# Patient Record
Sex: FEMALE | Race: BLACK OR AFRICAN AMERICAN
[De-identification: names, ages, dates, MRNs, and addresses within clinical notes are randomized per-mention and may not be internally consistent; named-entity substitution may affect disease eponyms.]

---

## 2019-09-07 ENCOUNTER — HOSPITAL ENCOUNTER (OUTPATIENT)
Dept: HOSPITAL 62 - LC | Age: 24
Discharge: HOME | End: 2019-09-07
Attending: OBSTETRICS & GYNECOLOGY
Payer: MEDICAID

## 2019-09-07 DIAGNOSIS — Z3A.35: ICD-10-CM

## 2019-09-07 DIAGNOSIS — O24.419: Primary | ICD-10-CM

## 2019-09-07 PROCEDURE — 59025 FETAL NON-STRESS TEST: CPT

## 2019-09-07 PROCEDURE — 4A1HXCZ MONITORING OF PRODUCTS OF CONCEPTION, CARDIAC RATE, EXTERNAL APPROACH: ICD-10-PCS | Performed by: OBSTETRICS & GYNECOLOGY

## 2019-10-08 ENCOUNTER — HOSPITAL ENCOUNTER (INPATIENT)
Dept: HOSPITAL 62 - LC | Age: 24
LOS: 2 days | Discharge: HOME | End: 2019-10-10
Attending: OBSTETRICS & GYNECOLOGY | Admitting: OBSTETRICS & GYNECOLOGY
Payer: MEDICAID

## 2019-10-08 DIAGNOSIS — Z3A.40: ICD-10-CM

## 2019-10-08 DIAGNOSIS — O32.6XX0: ICD-10-CM

## 2019-10-08 LAB
ADD MANUAL DIFF: NO
APPEARANCE UR: CLEAR
APTT PPP: YELLOW S
BARBITURATES UR QL SCN: NEGATIVE
BASOPHILS # BLD AUTO: 0.1 10^3/UL (ref 0–0.2)
BASOPHILS NFR BLD AUTO: 0.9 % (ref 0–2)
BILIRUB UR QL STRIP: NEGATIVE
EOSINOPHIL # BLD AUTO: 0 10^3/UL (ref 0–0.6)
EOSINOPHIL NFR BLD AUTO: 0.4 % (ref 0–6)
ERYTHROCYTE [DISTWIDTH] IN BLOOD BY AUTOMATED COUNT: 13.4 % (ref 11.5–14)
GLUCOSE UR STRIP-MCNC: NEGATIVE MG/DL
HCT VFR BLD CALC: 39.6 % (ref 36–47)
HGB BLD-MCNC: 13.4 G/DL (ref 12–15.5)
KETONES UR STRIP-MCNC: NEGATIVE MG/DL
LYMPHOCYTES # BLD AUTO: 1.6 10^3/UL (ref 0.5–4.7)
LYMPHOCYTES NFR BLD AUTO: 26.7 % (ref 13–45)
MCH RBC QN AUTO: 32.1 PG (ref 27–33.4)
MCHC RBC AUTO-ENTMCNC: 33.8 G/DL (ref 32–36)
MCV RBC AUTO: 95 FL (ref 80–97)
METHADONE UR QL SCN: NEGATIVE
MONOCYTES # BLD AUTO: 0.4 10^3/UL (ref 0.1–1.4)
MONOCYTES NFR BLD AUTO: 7.1 % (ref 3–13)
NEUTROPHILS # BLD AUTO: 4 10^3/UL (ref 1.7–8.2)
NEUTS SEG NFR BLD AUTO: 64.9 % (ref 42–78)
NITRITE UR QL STRIP: NEGATIVE
PCP UR QL SCN: NEGATIVE
PH UR STRIP: 7 [PH] (ref 5–9)
PLATELET # BLD: 145 10^3/UL (ref 150–450)
PROT UR STRIP-MCNC: NEGATIVE MG/DL
RBC # BLD AUTO: 4.17 10^6/UL (ref 3.72–5.28)
SP GR UR STRIP: 1.01
TOTAL CELLS COUNTED % (AUTO): 100 %
URINE AMPHETAMINES SCREEN: NEGATIVE
URINE BENZODIAZEPINES SCREEN: NEGATIVE
URINE COCAINE SCREEN: NEGATIVE
URINE MARIJUANA (THC) SCREEN: NEGATIVE
UROBILINOGEN UR-MCNC: NEGATIVE MG/DL (ref ?–2)
WBC # BLD AUTO: 6.2 10^3/UL (ref 4–10.5)

## 2019-10-08 PROCEDURE — 36415 COLL VENOUS BLD VENIPUNCTURE: CPT

## 2019-10-08 PROCEDURE — 86592 SYPHILIS TEST NON-TREP QUAL: CPT

## 2019-10-08 PROCEDURE — 85025 COMPLETE CBC W/AUTO DIFF WBC: CPT

## 2019-10-08 PROCEDURE — 86901 BLOOD TYPING SEROLOGIC RH(D): CPT

## 2019-10-08 PROCEDURE — 80307 DRUG TEST PRSMV CHEM ANLYZR: CPT

## 2019-10-08 PROCEDURE — 0HQ9XZZ REPAIR PERINEUM SKIN, EXTERNAL APPROACH: ICD-10-PCS | Performed by: ADVANCED PRACTICE MIDWIFE

## 2019-10-08 PROCEDURE — 86900 BLOOD TYPING SEROLOGIC ABO: CPT

## 2019-10-08 PROCEDURE — 85027 COMPLETE CBC AUTOMATED: CPT

## 2019-10-08 PROCEDURE — 81005 URINALYSIS: CPT

## 2019-10-08 PROCEDURE — 86850 RBC ANTIBODY SCREEN: CPT

## 2019-10-08 PROCEDURE — 0UQMXZZ REPAIR VULVA, EXTERNAL APPROACH: ICD-10-PCS | Performed by: ADVANCED PRACTICE MIDWIFE

## 2019-10-08 RX ADMIN — DOCUSATE SODIUM SCH MG: 100 CAPSULE, LIQUID FILLED ORAL at 17:25

## 2019-10-08 RX ADMIN — FERROUS SULFATE TAB 325 MG (65 MG ELEMENTAL FE) SCH: 325 (65 FE) TAB at 15:40

## 2019-10-08 RX ADMIN — FAMOTIDINE SCH: 20 TABLET, FILM COATED ORAL at 21:43

## 2019-10-08 RX ADMIN — FERROUS SULFATE TAB 325 MG (65 MG ELEMENTAL FE) SCH MG: 325 (65 FE) TAB at 17:26

## 2019-10-08 RX ADMIN — DOCUSATE SODIUM SCH: 100 CAPSULE, LIQUID FILLED ORAL at 15:40

## 2019-10-08 RX ADMIN — Medication SCH: at 19:32

## 2019-10-08 RX ADMIN — FAMOTIDINE SCH: 20 TABLET, FILM COATED ORAL at 19:32

## 2019-10-08 RX ADMIN — SENNOSIDES, DOCUSATE SODIUM SCH: 50; 8.6 TABLET, FILM COATED ORAL at 19:32

## 2019-10-08 RX ADMIN — IBUPROFEN SCH MG: 800 TABLET, FILM COATED ORAL at 21:42

## 2019-10-08 RX ADMIN — IBUPROFEN SCH: 800 TABLET, FILM COATED ORAL at 17:46

## 2019-10-08 NOTE — ADMISSION PHYSICAL
=================================================================



=================================================================

Datetime Report Generated by CPN: 10/08/ 08:44

   

   

=================================================================

CURRENT ADMISSION

=================================================================

   

Prenatal Hx Assessment:  The Prenatal History has been Reviewed and is

   Current

Chief Complaint:  Uterine Contractions

Indication for Induction:  Not Applicable

Admit Impression :  Term, Intrauterine Pregnancy

Admit Plan:  Admit to Unit; Initiate Labor Protocol

   

=================================================================

ALLERGIES

=================================================================

   

Medication Allergies:  No

Medication Allergies:  No Known Allergies (10/08/2019)

Latex:  No Latex Allergies

   

=================================================================

OBSTETRICAL HISTORY

=================================================================

   

EDC:  10/07/2019 00:00

:  2

Para:  0

:  1

Livin

Gestational Diabetes:  Yes

Rh Sensitization:  No

Incompetent Cervix:  No

TRAV:  No

Infertility:  No

ART Treatment:  No

Uterine Anomaly:  No

IUGR:  No

Hx Previous C/S:  No

Macrosomia:  No

Hx Loss/Stillborn:  No

PIH:  No

Hx  Death:  No

Placenta Previa/Abruption:  No

Depression/PP Depression:  No

PTL/PROM:  No

Post Partum Hemorrhage:  No

Current Pregnancy Procedures:  Ultrasound; NST

Obstetrical History Comments:  G1-Delivered at 19.2 weeks, fetal death

   within 30 minutes of birth

   G2-Current

   

=================================================================

***SEE PRENATAL RECORDS***

=================================================================

   

Alcohol:  No

Marijuana :  No

Cocaine:  No

Other Illicit Drugs:  No

Cigarettes:  Never Smoker. [id number]

   

=================================================================

MEDICAL HISTORY

=================================================================

   

Diabetes:  Yes

Diabetes Type:  Gestational Diabetes

Blood Transfusion:  No

Pulmonary Disease (Asthma, TB):  No

Breast Disease:  No

Hypertension:  No

Gyn Surgery:  No

Heart Disease:  No

Hosp/Surgery:  No

Autoimmune Disorder:  No

Anesthetic Complications:  No

Kidney Disease:  No

Abnormal Pap Smear:  No

Neuro/Epilepsy:  No

Psychiatric Disorders:  No

Other Medical Diseases:  No

Hepatitis/Liver Disease:  No

Significant Family History:  No

Varicosities/Phlebitis:  No

Trauma/Violence :  No

Thyroid Dysfunction:  No

Medical History Comments:  GDM-diet controlled

   

=================================================================

INFECTIOUS HISTORY

=================================================================

   

Gonorrhea:  No

Genital Herpes:  No

Chlamydia:  No

Tuberculosis:  No

Syphilis:  No

Hepatitis:  No

HIV/AIDS Exposure:  No

Rash or Viral Illness:  No

HPV:  No

   

=================================================================

PHYSICAL EXAM

=================================================================

   

General:  Normal

HEENT:  Normal

Neurologic:  Normal

Thyroid:  Normal

Heart:  Normal

Lungs:  Normal

Breast:  Normal

Back:  Normal

Abdomen:  Normal

Genitourinary Exam:  Normal

Extremities:  Normal

DTRs:  Normal

Pelvic Type:  Adequate

Physical Exam Comments:  G2 P 1

   MFM for GDM

   Fetal loss at 20 weeks

   Marginal cord insertion

   IUGR, IOL @ 39 weeks, pt declined

   Fetal Choroid plexus cyst

   GBS neg

Vital Signs:  Reviewed

   

=================================================================

MEMBRANES

=================================================================

   

Membranes:  Intact

   

=================================================================

FETUS A

=================================================================

   

EGA:  40.1

Monitoring:  External US

Fetal Presentation:  Vertex

Admit Comment:  Admitted to LD in active labor @ 40.1, complete,

   bulging BOW

   UC's q 2 min, variable decelerations with uc's, Cat 1 strip

   will arom and anticipate 

   

=================================================================

PLANS FOR LABOR AND DELIVERY

=================================================================

   

Labor and Delivery:  None

Pain Management:  None

Feeding Preference:  Breast

Circumcision:  N/A

   

=================================================================

INFORMED CONSENT

=================================================================

   

Assignment:  Rosalie Monroy MD

Signature:  Electronically signed by Mandy Delgadillo CNM on 10/8/2019 at

   08:43  with User ID: [email or web address]

:  Electronically signed by Mandy Delgadillo CNM on 10/8/2019 at 08:43  with

   User ID: [email or web address]

## 2019-10-08 NOTE — DELIVERY SUMMARY
=================================================================

Del Sum A-C

=================================================================

Datetime Report Generated by CPN: 10/2019 09:53

   

   

=================================================================

DELIVERY PERSONNEL

=================================================================

   

DELIVERY PERSONNEL:  [id number]

Delivery Doctor::  Mandy Delgadillo CNM

Labor and Delivery Nurse::  Lisa Chambers RN

Labor and Delivery Nurse::  Betty Navarrete RN

Nursery Nurse::  Nelia Spears RN

Scrub Tech/CNA:  Saray Damon CNA II

   

=================================================================

MATERNAL INFORMATION

=================================================================

   

Delivery Anesthesia:  None

Medications After Delivery:  Pitocin Bolus-Please Comment; Pitocin Drip

   20 Units/1000ml NSS

Meds After Delivery Comment:  Pitocin 20 units in 1 L NS bolusing per

   order

Estimated  Blood Loss (ml):  300

Maternal Complications:  Precipitous Labor (<3hrs)

Provider Comments:   viable female for EDVIN to OA over lacerations,

   placed on mothers abd, cord clamped and cut after 2 minutes by

   father of baby, spont delivery of grossly normal placenta, FFFM,

   uterine massage and Pitocin

    lacerations repaired with 2-0 chromic without difficulty, cervix

   intact, melendez placed to assure patency, 100 cc urine obtained

   Baby and mom remain in recovery in stable condition, plans to breast

   feed

   

=================================================================

LABOR SUMMARY

=================================================================

   

EDC:  10/07/2019 00:00

No. Babies in Womb:  1

 Attempted:  No

Labor Anesthesia:  None

   

=================================================================

LABOR INFORMATION

=================================================================

   

Reason for Induction:  Not Applicable

Complete Dilatation:  10/08/2019 08:45

Oxytocin:  N/A

Group B Beta Strep:  neg

Antibiotics # of Doses:  0

Antibiotics Time of Last Dose:  n/a

Name of Antibiotic Given:  n/a

Steroids Given:  None

Reason Steroids Not Administered:  Not Applicable

   

=================================================================

MEMBRANES

=================================================================

   

Membranes Rupture Method:  Artificial

Rupture of Membranes:  10/08/2019 08:48

Length of Rupture (hr):  0.15

Amniotic Fluid Color:  Clear

Amniotic Fluid Amount:  None

Amniotic Fluid Odor:  Normal

   

=================================================================

STAGES OF LABOR

=================================================================

   

Stage 2 hr:  0

Stage 2 min:  12

Stage 3 hr:  0

Stage 3 min:  4

   

=================================================================

VAGINAL DELIVERY

=================================================================

   

Laceration #1:  Perineal; Periurethral

Laceration Extension #1:  First Degree

Laceration Repair:  Yes

Laceration Repair Note:  left and right periurethral sutured and

   perineal lac

   sutured with no issues

Sponge Count Correct:  Yes

Sharps Count Correct:  Yes

   

=================================================================

CSECTION DELIVERY

=================================================================

   

Primary Indication:  N/A

Secondary Indication:  N/A

CSection Incidence:  N/A

Labor:  N/A

Elective:  N/A

CSection Incision:  N/A

   

=================================================================

BABY A INFORMATION

=================================================================

   

Infant Delivery Date/Time:  10/08/2019 08:57

Method of Delivery:  Vaginal

Born in Route :  No

:  N/A

Forceps:  N/A

Vacuum Extraction:  N/A

Shoulder Dystocia :  No

   

=================================================================

PRESENTATION/POSITION BABY A

=================================================================

   

Presentation:  Cephalic

Cephalic Presentation:  Vertex

Vertex Position:  Left Occipital Anterior

Breech Presentation:  N/A

   

=================================================================

PLACENTA INFORMATION BABY A

=================================================================

   

Placenta Delivery Time :  10/08/2019 09:01

Placenta Method of Delivery:  Spontaneous

Placenta Status:  Delivered

   

=================================================================

APGAR SCORES BABY A

=================================================================

   

Heart Rate 1 min:  >100 bpm

Resp Effort 1 min:  Good Cry

Reflex Irritability 1 min:  Cough or Sneeze or Pulls Away

Muscle Tone 1 min:  Active Motion

Color 1 min:  Blue/Pale

Resuscitation Effort 1 min:  Tactile Stimulation

APGAR SCORE 1 MIN:  8

Heart Rate 5 min:  >100 bpm

Resp Effort 5 min:  Good Cry

Reflex Irritability 5 min:  Cough or Sneeze or Pulls Away

Muscle Tone 5 min:  Active Motion

Color 5 min:  Body Pink, Extremities Blue

Resuscitation Effort 5 min:  Tactile Stimulation

APGAR SCORE 5 MIN:  9

   

=================================================================

INFANT INFORMATION BABY A

=================================================================

   

Gestational Age at Delivery:  40.1

Gestational Status:  Full Term- 39- 40.6 Weeks

Infant Outcome :  Liveborn

Infant Condition :  Stable

Infant Sex:  Female

   

=================================================================

IDENTIFICATION BABY A

=================================================================

   

Infant Verification Date/Time:  10/08/2019 08:57

ID Band Number:  [id number]

Mother's Name Verified:  Yes

Infant Medical Record Number:  [id number]

RN Verifying Infant:  C. Regan RN

   

=================================================================

CORD INFORMATION BABY A

=================================================================

   

No. Cord Vessels:  3

Nuchal Cord :  N/A

Nuchal Cord- Other:  compound right hand

Cord Blood Taken:  Yes-For Storage (Mom's Blood type +)

   

=================================================================

ASSESSMENT BABY A

=================================================================

   

Infant Complications:  None

Physical Findings at Delivery:  Within Normal Limits

Infant Respirations:  Appears Normal

Skin to Skin:  Yes

Skin to Skin Time (min):  60

Neonatologist/ALS Called :  No

Infant Care By:  Nelia Spears, RN

   

=================================================================

BABY B INFORMATION

=================================================================

   

 :  N/A

## 2019-10-09 LAB
ERYTHROCYTE [DISTWIDTH] IN BLOOD BY AUTOMATED COUNT: 13.4 % (ref 11.5–14)
HCT VFR BLD CALC: 37.2 % (ref 36–47)
HGB BLD-MCNC: 12.6 G/DL (ref 12–15.5)
MCH RBC QN AUTO: 32.5 PG (ref 27–33.4)
MCHC RBC AUTO-ENTMCNC: 34 G/DL (ref 32–36)
MCV RBC AUTO: 96 FL (ref 80–97)
PLATELET # BLD: 133 10^3/UL (ref 150–450)
RBC # BLD AUTO: 3.89 10^6/UL (ref 3.72–5.28)
WBC # BLD AUTO: 9.5 10^3/UL (ref 4–10.5)

## 2019-10-09 RX ADMIN — DOCUSATE SODIUM SCH MG: 100 CAPSULE, LIQUID FILLED ORAL at 17:29

## 2019-10-09 RX ADMIN — IBUPROFEN SCH MG: 800 TABLET, FILM COATED ORAL at 14:35

## 2019-10-09 RX ADMIN — IBUPROFEN SCH MG: 800 TABLET, FILM COATED ORAL at 21:40

## 2019-10-09 RX ADMIN — FAMOTIDINE SCH MG: 20 TABLET, FILM COATED ORAL at 21:40

## 2019-10-09 RX ADMIN — FAMOTIDINE SCH MG: 20 TABLET, FILM COATED ORAL at 09:55

## 2019-10-09 RX ADMIN — DOCUSATE SODIUM SCH MG: 100 CAPSULE, LIQUID FILLED ORAL at 09:55

## 2019-10-09 RX ADMIN — IBUPROFEN SCH MG: 800 TABLET, FILM COATED ORAL at 05:57

## 2019-10-09 RX ADMIN — SENNOSIDES, DOCUSATE SODIUM SCH EACH: 50; 8.6 TABLET, FILM COATED ORAL at 09:55

## 2019-10-09 RX ADMIN — FERROUS SULFATE TAB 325 MG (65 MG ELEMENTAL FE) SCH MG: 325 (65 FE) TAB at 17:29

## 2019-10-09 RX ADMIN — FERROUS SULFATE TAB 325 MG (65 MG ELEMENTAL FE) SCH MG: 325 (65 FE) TAB at 09:55

## 2019-10-09 RX ADMIN — Medication SCH CAP: at 09:55

## 2019-10-09 NOTE — PDOC PROGRESS REPORT
Subjective-OB


Progress Note for:: 10/09/19 - PP Day #1, doing well, no complaints, A+, rubella

pending, breastfeeding





Physical Exam (OB)


Vital Signs: 


                                        











Temp Pulse Resp BP Pulse Ox


 


 97.5 F   82   14   108/75   99 


 


 10/09/19 07:26  10/09/19 07:26  10/09/19 07:26  10/09/19 07:26  10/09/19 07:26








                                 Intake & Output











 10/08/19 10/09/19 10/10/19





 06:59 06:59 06:59


 


Weight  65.1 kg 














- General


General Appearance: Appears well, Alert


In distress: None





- PIH/Pre-Eclampsia


DTR's: 2 +


Clonus: Negative


Headache: Absent


Epigastric Pain: No


Visual Changes: No





- Lochia


Lochia Amount: Small 10-25 ml


Lochia Color: Rubra/Red





- Abdomen


Description: Soft, Round


Fundal Description: Firm, Midline


Fundal Height: u/u - u/2





- Respiratory


Respiratory Status: No respiratory distress





- Abdominal


Inspection: Normal


Distension: No distension





- Genitourinary


Genitourinary Note: 





voiding





- Extremities


Upper extremity: Normal inspection


Lower extremities: Normal inspection





- Neurological


Cognition: Normal


Orientation: AAOx4





- Psychological


Associated symptoms: Normal affect, Normal mood





- Skin


Skin Temperature: Warm


Skin Moisture: Dry





Objective-Diagnostic


Laboratory: 


                                        





                                 10/09/19 09:51 





                                        











  10/09/19





  09:51


 


WBC  9.5


 


RBC  3.89


 


Hgb  12.6


 


Hct  37.2


 


MCV  96


 


MCH  32.5


 


MCHC  34.0


 


RDW  13.4


 


Plt Count  133 L














Assessment and Plan(PN)





- Assessment and Plan


(1) Normal postpartum course


Is this a current diagnosis for this admission?: Yes   





(2) Delivery normal


Is this a current diagnosis for this admission?: Yes   





(3) Gestational diabetes mellitus


Qualifiers: 


   Gestational diabetes mellitus control: diet-controlled   Trimester: third 

trimester   Qualified Code(s): O24.410 - Gestational diabetes mellitus in 

pregnancy, diet controlled   


Is this a current diagnosis for this admission?: Yes   





- Time Spent with Patient


Time with patient: Less than 15 minutes


Medications reviewed and adjusted accordingly: Yes





- Disposition


Anticipated Discharge: Home


Within: within 24 hours

## 2019-10-10 VITALS — SYSTOLIC BLOOD PRESSURE: 110 MMHG | DIASTOLIC BLOOD PRESSURE: 69 MMHG

## 2019-10-10 RX ADMIN — FAMOTIDINE SCH MG: 20 TABLET, FILM COATED ORAL at 09:09

## 2019-10-10 RX ADMIN — IBUPROFEN SCH: 800 TABLET, FILM COATED ORAL at 13:39

## 2019-10-10 RX ADMIN — IBUPROFEN SCH MG: 800 TABLET, FILM COATED ORAL at 05:57

## 2019-10-10 RX ADMIN — DOCUSATE SODIUM SCH MG: 100 CAPSULE, LIQUID FILLED ORAL at 09:09

## 2019-10-10 RX ADMIN — FERROUS SULFATE TAB 325 MG (65 MG ELEMENTAL FE) SCH MG: 325 (65 FE) TAB at 09:09

## 2019-10-10 RX ADMIN — SENNOSIDES, DOCUSATE SODIUM SCH EACH: 50; 8.6 TABLET, FILM COATED ORAL at 09:09

## 2019-10-10 RX ADMIN — Medication SCH CAP: at 09:09

## 2019-10-10 NOTE — PDOC PROGRESS REPORT
Subjective-OB


Progress Note for:: 10/10/19


Subjective: 





Ready for discharge.





Physical Exam (OB)


Vital Signs: 


                                        











Temp Pulse Resp BP Pulse Ox


 


 97.4 F   87   14   110/69   98 


 


 10/10/19 08:30  10/10/19 08:30  10/10/19 08:30  10/10/19 08:30  10/10/19 08:30








                                 Intake & Output











 10/09/19 10/10/19 10/11/19





 06:59 06:59 06:59


 


Intake Total  400 


 


Balance  400 


 


Weight 65.1 kg  














- PIH/Pre-Eclampsia


DTR's: 1 +


Clonus: Negative


Headache: Absent


Epigastric Pain: No


Visual Changes: No





- Lochia


Lochia Amount: Scant < 10 ml


Lochia Color: Rubra/Red





- Abdomen


Description: Soft, Round


Hernia Present: No


Bowel Sounds: Normoactive


Flatus Presence: Present


Stool: Yes


Fundal Description: Firm, Midline


Fundal Height: u/u - u/2





Objective-Diagnostic


Laboratory: 


                                        





                                 10/09/19 09:51 











Assessment and Plan(PN)





- Time Spent with Patient


Medications reviewed and adjusted accordingly: Yes





- Disposition


Anticipated Discharge: Home

## 2019-10-10 NOTE — PDOC DISCHARGE SUMMARY
Impression





- Admit/DC Date/PCP


Admission Date/Primary Care Provider: 


  10/08/19 08:32





  MARCELLE REECE MD





Discharge Date: 10/10/19





- Discharge Diagnosis


(1) Delivery normal


Is this a current diagnosis for this admission?: Yes   





(2) Gestational diabetes mellitus


Is this a current diagnosis for this admission?: Yes   





(3) Normal postpartum course


Is this a current diagnosis for this admission?: Yes   





- Additional Information


Resuscitation Status: Full Code


Discharge Diet: Regular


Discharge Activity: Activity As Tolerated, Balance Activity w/Rest, Pelvic Rest,

Slowly Increase Activity, No tub bath


Referrals: 


MARCELLE REECE MD [Primary Care Provider] - 


Home Medications: 








Prenatal Vit,Calc76/Iron/Folic [Prenatabs Rx Tablet] 1 tab PO DAILY 09/07/19 











HPI


Gestational Age: 40.1 wks


Reason(s) for Admission: Onset of Labor


Prenatal Procedures: Ultrasound


Intrapartum Procedure(s): Spontaneous Vaginal Delivery


Postpartum Complication(s): Laceration-Perineal, Laceration-Periurethral


Laceration-Degree: 1st





Results


Laboratory Results: 


                                        











WBC  9.5 10^3/uL (4.0-10.5)   10/09/19  09:51    


 


RBC  3.89 10^6/uL (3.72-5.28)   10/09/19  09:51    


 


Hgb  12.6 g/dL (12.0-15.5)   10/09/19  09:51    


 


Hct  37.2 % (36.0-47.0)   10/09/19  09:51    


 


MCV  96 fl (80-97)   10/09/19  09:51    


 


MCH  32.5 pg (27.0-33.4)   10/09/19  09:51    


 


MCHC  34.0 g/dL (32.0-36.0)   10/09/19  09:51    


 


RDW  13.4 % (11.5-14.0)   10/09/19  09:51    


 


Plt Count  133 10^3/uL (150-450)  L  10/09/19  09:51    


 


Lymph % (Auto)  26.7 % (13-45)   10/08/19  08:42    


 


Mono % (Auto)  7.1 % (3-13)   10/08/19  08:42    


 


Eos % (Auto)  0.4 % (0-6)   10/08/19  08:42    


 


Baso % (Auto)  0.9 % (0-2)   10/08/19  08:42    


 


Absolute Neuts (auto)  4.0 10^3/uL (1.7-8.2)   10/08/19  08:42    


 


Absolute Lymphs (auto)  1.6 10^3/uL (0.5-4.7)   10/08/19  08:42    


 


Absolute Monos (auto)  0.4 10^3/uL (0.1-1.4)   10/08/19  08:42    


 


Absolute Eos (auto)  0.0 10^3/uL (0.0-0.6)   10/08/19  08:42    


 


Absolute Basos (auto)  0.1 10^3/uL (0.0-0.2)   10/08/19  08:42    


 


Seg Neutrophils %  64.9 % (42-78)   10/08/19  08:42    


 


Urine Color  YELLOW   10/08/19  08:20    


 


Urine Appearance  CLEAR   10/08/19  08:20    


 


Urine pH  7.0  (5.0-9.0)   10/08/19  08:20    


 


Ur Specific Gravity  1.010   10/08/19  08:20    


 


Urine Protein  NEGATIVE mg/dL (NEGATIVE)   10/08/19  08:20    


 


Urine Glucose (UA)  NEGATIVE mg/dL (NEGATIVE)   10/08/19  08:20    


 


Urine Ketones  NEGATIVE mg/dL (NEGATIVE)   10/08/19  08:20    


 


Urine Blood  NEGATIVE  (NEGATIVE)   10/08/19  08:20    


 


Urine Nitrite  NEGATIVE  (NEGATIVE)   10/08/19  08:20    


 


Urine Bilirubin  NEGATIVE  (NEGATIVE)   10/08/19  08:20    


 


Urine Urobilinogen  NEGATIVE mg/dL (<2.0)   10/08/19  08:20    


 


Ur Leukocyte Esterase  NEGATIVE  (NEGATIVE)   10/08/19  08:20    


 


Urine Ascorbic Acid  NEGATIVE  (NEGATIVE)   10/08/19  08:20    


 


Urine Opiates Screen  NEGATIVE   10/08/19  08:20    


 


Urine Methadone Screen  NEGATIVE   10/08/19  08:20    


 


Ur Barbiturates Screen  NEGATIVE   10/08/19  08:20    


 


Ur Phencyclidine Scrn  NEGATIVE   10/08/19  08:20    


 


Ur Amphetamines Screen  NEGATIVE   10/08/19  08:20    


 


U Benzodiazepines Scrn  NEGATIVE   10/08/19  08:20    


 


Urine Cocaine Screen  NEGATIVE   10/08/19  08:20    


 


U Marijuana (THC) Screen  NEGATIVE   10/08/19  08:20    


 


RPR  NONREACTIVE  (NONREACTIVE)   10/08/19  08:42    


 


Blood Type  A POSITIVE   10/08/19  08:42    


 


Antibody Screen  NEGATIVE   10/08/19  08:42    














Plan


Goals: 


Follow up at [facility] in 4 wks or prn.